# Patient Record
Sex: FEMALE | Race: WHITE | NOT HISPANIC OR LATINO
[De-identification: names, ages, dates, MRNs, and addresses within clinical notes are randomized per-mention and may not be internally consistent; named-entity substitution may affect disease eponyms.]

---

## 2019-03-07 PROBLEM — Z00.00 ENCOUNTER FOR PREVENTIVE HEALTH EXAMINATION: Status: ACTIVE | Noted: 2019-03-07

## 2019-03-13 ENCOUNTER — APPOINTMENT (OUTPATIENT)
Dept: GASTROENTEROLOGY | Facility: CLINIC | Age: 59
End: 2019-03-13

## 2019-05-29 ENCOUNTER — APPOINTMENT (OUTPATIENT)
Dept: GASTROENTEROLOGY | Facility: CLINIC | Age: 59
End: 2019-05-29
Payer: COMMERCIAL

## 2019-05-29 VITALS
DIASTOLIC BLOOD PRESSURE: 60 MMHG | OXYGEN SATURATION: 98 % | WEIGHT: 110 LBS | HEIGHT: 62 IN | SYSTOLIC BLOOD PRESSURE: 110 MMHG | BODY MASS INDEX: 20.24 KG/M2 | HEART RATE: 75 BPM

## 2019-05-29 DIAGNOSIS — Z78.9 OTHER SPECIFIED HEALTH STATUS: ICD-10-CM

## 2019-05-29 DIAGNOSIS — R63.4 ABNORMAL WEIGHT LOSS: ICD-10-CM

## 2019-05-29 PROCEDURE — 99245 OFF/OP CONSLTJ NEW/EST HI 55: CPT

## 2019-05-29 RX ORDER — PAROXETINE HYDROCHLORIDE 10 MG/1
10 TABLET, FILM COATED ORAL
Refills: 0 | Status: ACTIVE | COMMUNITY

## 2019-05-29 RX ORDER — HYDROCHLOROTHIAZIDE 12.5 MG/1
12.5 CAPSULE ORAL
Refills: 0 | Status: ACTIVE | COMMUNITY

## 2019-05-29 RX ORDER — PANTOPRAZOLE 40 MG/1
40 TABLET, DELAYED RELEASE ORAL
Refills: 0 | Status: ACTIVE | COMMUNITY

## 2019-05-29 NOTE — HISTORY OF PRESENT ILLNESS
[FreeTextEntry1] : 58 year F anxiety, htn,HLD, migraine HA, GERD on PPI x 2 years reflux controlled, h/o duodenal stricture,\par h/o fall in 02/2019 c/b LLE fracture required surgery, and RLE fx, presents for evaluation of h/o duodenal stricture. \par \par Due to recent fractures she takes Advil most days, she is in PT\par she lost 30 lbs since Feb due to fear of eating/stress related to fractures, she is continuing to lose weight. lost 8 lbs in the past month, \par she has feeling of fullness/heaviness after eating, improves with Gelusil /mylanta, she is afraid that she will develop similar problems to the past when she had severe duodenal stricture a/w chroinc vomiting. \par she takes Excedrin PRN for migraines 3-4 days of week. \par \par No prior colonoscopy\par \par Patient denies abdominal pain , n/v/heartburn, reflux, dysphagia/odynophagia, change in bowel habits, diarrhea, constipation, brbpr, melena.  Patient has daily BM, \par \par \par EGD 2/28/17 with Dr Enciso- \par for vomiting x 6 mos, no response to PPI, prior excessive Excedrin use, \par reflux ulcers at 30 cm, EGJ at 30 cm, 9-10 cm hiatal hernia, post bulbar duodenal stricture, benign appearing, \par rec:liquid diet, resume PPI, CT scan (performed at [facility]), \par path 2/28/17-\par antrum- acute chronic gastritis, neg HP\par esophagus at 30 cm bx- GEJ mucosa with squamous basal cell hyperplasia, chronic inflammation c/w reflux changes, no IM or dysplasia. \par \par \par CT 02/2017-results not available, reportedly unremarkable\par \par EGD 5/2/17 with Dr. Bardales- for persistent vomiting- \par edematous mucosa post bulbar duodenum with tight stricture, dilated, 36 F (12mm), small amount of bleeding after dilation, \par rec: continue bid ppi, pureed diet, UGI series to evaluate stricture (not seen on CT scan), probable repeat dilatation under fluoroscopy with intubation consider stenting stricture \par \par UGI series 5/16/17-\par persistent partially changeable bandlike narrowing in upper part of the second portio of the duodenal C sweep . no hold up to flow of barium.  no hiatal hernia\par \par she was been on liquid diet x 4 mos in 2017, gradually resumed regular diet which she is still on. \par she has been on PPI daily since 2017. 1/2 hour prior to dinner. \par \par Never had a colonoscopy

## 2019-05-29 NOTE — REASON FOR VISIT
[Consultation] : a consultation visit [FreeTextEntry1] : Patient seen at the request of Dr. Harper for the evaluation of duodenal stricture

## 2019-05-29 NOTE — ASSESSMENT
[FreeTextEntry1] : postprandial epigastric fullness  may be due to recurrent stricture which was previously thought to be NSAID induced. Recommend UGI series for evaluation followed by EGD +/- dilation of stricture. Recommend small frequent meals, limit NSAIDs, continue PPI.\par \par Recommend screening colonoscopy . Risks (including but not limited to sedation risks, infection, bleeding, perforation, possibility of missed lesions), benefits and alternatives to procedure, including not doing the procedure, were discussed with patient. Patient understood and agreed to proceed with colonoscopy. \par Colonoscopy preparation instructions reviewed with patient.\par

## 2019-05-29 NOTE — PHYSICAL EXAM
[General Appearance - In No Acute Distress] : in no acute distress [General Appearance - Alert] : alert [Outer Ear] : the ears and nose were normal in appearance [Sclera] : the sclera and conjunctiva were normal [] : no respiratory distress [Apical Impulse] : the apical impulse was normal [Neck Appearance] : the appearance of the neck was normal [Abdomen Tenderness] : non-tender [Abdomen Soft] : soft [Abnormal Walk] : normal gait [Skin Color & Pigmentation] : normal skin color and pigmentation [No Focal Deficits] : no focal deficits [Oriented To Time, Place, And Person] : oriented to person, place, and time [FreeTextEntry1] : deferred to upcoming colonoscopy

## 2019-06-05 ENCOUNTER — RESULT REVIEW (OUTPATIENT)
Age: 59
End: 2019-06-05

## 2019-07-02 ENCOUNTER — OTHER (OUTPATIENT)
Age: 59
End: 2019-07-02

## 2019-07-02 ENCOUNTER — APPOINTMENT (OUTPATIENT)
Dept: RHEUMATOLOGY | Facility: CLINIC | Age: 59
End: 2019-07-02
Payer: COMMERCIAL

## 2019-07-02 VITALS
BODY MASS INDEX: 20.24 KG/M2 | HEIGHT: 62 IN | SYSTOLIC BLOOD PRESSURE: 112 MMHG | WEIGHT: 110 LBS | DIASTOLIC BLOOD PRESSURE: 70 MMHG

## 2019-07-02 PROCEDURE — 99243 OFF/OP CNSLTJ NEW/EST LOW 30: CPT

## 2019-07-02 RX ORDER — BUTALBITAL, ACETAMINOPHEN AND CAFFEINE 325; 50; 40 MG/1; MG/1; MG/1
50-325-40 TABLET ORAL
Qty: 90 | Refills: 0 | Status: ACTIVE | COMMUNITY
Start: 2019-04-30

## 2019-07-02 RX ORDER — ROSUVASTATIN CALCIUM 5 MG/1
5 TABLET, FILM COATED ORAL
Qty: 30 | Refills: 0 | Status: ACTIVE | COMMUNITY
Start: 2019-05-29

## 2019-07-02 RX ORDER — POLYETHYLENE GLYCOL 3350, SODIUM SULFATE, SODIUM CHLORIDE, POTASSIUM CHLORIDE, ASCORBIC ACID, SODIUM ASCORBATE 7.5-2.691G
100 KIT ORAL
Qty: 1 | Refills: 0 | Status: DISCONTINUED | COMMUNITY
Start: 2019-05-29 | End: 2019-07-02

## 2019-07-02 NOTE — REVIEW OF SYSTEMS
[Joint Pain] : joint pain [Eye Pain] : no eye pain [Fever] : no fever [Chills] : no chills [Red Eyes] : eyes not red [Shortness Of Breath] : no shortness of breath [Cough] : no cough [Abdominal Pain] : no abdominal pain [Diarrhea] : no diarrhea [Joint Swelling] : no joint swelling [Joint Stiffness] : no joint stiffness [Skin Lesions] : no skin lesions [FreeTextEntry6] : No pleuritic C/P

## 2019-07-02 NOTE — HISTORY OF PRESENT ILLNESS
[FreeTextEntry1] : Patient fell off of steps onto concrete in January and sustained multiple fractures in the ankle - left >> right - and ortho tending to fractures told patent he thought she should have a BMD test. Last month was done, and revealed severe OP and patient was referred here for OP. No previous fracture.

## 2019-07-02 NOTE — PHYSICAL EXAM
[General Appearance - Alert] : alert [General Appearance - In No Acute Distress] : in no acute distress [General Appearance - Well Nourished] : well nourished [General Appearance - Well Developed] : well developed [Sclera] : the sclera and conjunctiva were normal [Auscultation Breath Sounds / Voice Sounds] : lungs were clear to auscultation bilaterally [Cervical Lymph Nodes Enlarged Posterior Bilaterally] : posterior cervical [Cervical Lymph Nodes Enlarged Anterior Bilaterally] : anterior cervical [] : no rash [Motor Exam] : the motor exam was normal [FreeTextEntry1] : There is no active synovitis throughout. There is crepitus of the patellofemoral joints bilaterally, with mild genu valgus.

## 2019-08-05 ENCOUNTER — RESULT REVIEW (OUTPATIENT)
Age: 59
End: 2019-08-05

## 2019-08-06 ENCOUNTER — APPOINTMENT (OUTPATIENT)
Dept: GASTROENTEROLOGY | Facility: HOSPITAL | Age: 59
End: 2019-08-06

## 2019-08-15 ENCOUNTER — APPOINTMENT (OUTPATIENT)
Dept: RHEUMATOLOGY | Facility: CLINIC | Age: 59
End: 2019-08-15
Payer: COMMERCIAL

## 2019-08-15 PROCEDURE — 99212 OFFICE O/P EST SF 10 MIN: CPT | Mod: 25

## 2019-08-15 PROCEDURE — 96372 THER/PROPH/DIAG INJ SC/IM: CPT

## 2019-08-15 NOTE — PHYSICAL EXAM
[General Appearance - Alert] : alert [General Appearance - In No Acute Distress] : in no acute distress [General Appearance - Well Developed] : well developed [General Appearance - Well Nourished] : well nourished [FreeTextEntry1] : There is no active synovitis throughout. There is crepitus of the patellofemoral joints bilaterally, with mild genu valgus.

## 2019-08-15 NOTE — REVIEW OF SYSTEMS
[Joint Pain] : joint pain [Fever] : no fever [Chills] : no chills [Eye Pain] : no eye pain [Red Eyes] : eyes not red [Shortness Of Breath] : no shortness of breath [Cough] : no cough [Abdominal Pain] : no abdominal pain [Diarrhea] : no diarrhea [Joint Swelling] : no joint swelling [Joint Stiffness] : no joint stiffness [Skin Lesions] : no skin lesions [FreeTextEntry6] : No pleuritic C/P

## 2019-08-18 ENCOUNTER — TRANSCRIPTION ENCOUNTER (OUTPATIENT)
Age: 59
End: 2019-08-18

## 2019-09-03 ENCOUNTER — APPOINTMENT (OUTPATIENT)
Dept: GASTROENTEROLOGY | Facility: HOSPITAL | Age: 59
End: 2019-09-03

## 2019-09-18 ENCOUNTER — APPOINTMENT (OUTPATIENT)
Dept: GASTROENTEROLOGY | Facility: HOSPITAL | Age: 59
End: 2019-09-18

## 2020-01-06 ENCOUNTER — RX RENEWAL (OUTPATIENT)
Age: 60
End: 2020-01-06

## 2020-02-05 ENCOUNTER — APPOINTMENT (OUTPATIENT)
Dept: INTERNAL MEDICINE | Facility: CLINIC | Age: 60
End: 2020-02-05
Payer: COMMERCIAL

## 2020-02-05 ENCOUNTER — MED ADMIN CHARGE (OUTPATIENT)
Age: 60
End: 2020-02-05

## 2020-02-05 ENCOUNTER — APPOINTMENT (OUTPATIENT)
Dept: INTERNAL MEDICINE | Facility: CLINIC | Age: 60
End: 2020-02-05

## 2020-02-05 DIAGNOSIS — M81.0 AGE-RELATED OSTEOPOROSIS W/OUT CURRENT PATHOLOGICAL FRACTURE: ICD-10-CM

## 2020-02-05 DIAGNOSIS — M17.0 BILATERAL PRIMARY OSTEOARTHRITIS OF KNEE: ICD-10-CM

## 2020-02-05 PROCEDURE — 96372 THER/PROPH/DIAG INJ SC/IM: CPT | Mod: 59

## 2020-02-05 PROCEDURE — 96401 CHEMO ANTI-NEOPL SQ/IM: CPT

## 2020-02-05 RX ORDER — DENOSUMAB 60 MG/ML
60 INJECTION SUBCUTANEOUS
Qty: 1 | Refills: 0 | Status: COMPLETED | OUTPATIENT
Start: 2020-02-05

## 2020-02-05 RX ADMIN — DENOSUMAB 60 MG/ML: 60 INJECTION SUBCUTANEOUS at 00:00

## 2020-02-10 PROBLEM — M81.0 AGE-RELATED OSTEOPOROSIS WITHOUT CURRENT PATHOLOGICAL FRACTURE: Status: ACTIVE | Noted: 2019-07-02

## 2020-07-16 RX ORDER — DENOSUMAB 60 MG/ML
60 INJECTION SUBCUTANEOUS
Qty: 1 | Refills: 0 | Status: ACTIVE | COMMUNITY
Start: 2019-07-02 | End: 1900-01-01

## 2020-08-05 ENCOUNTER — APPOINTMENT (OUTPATIENT)
Dept: INTERNAL MEDICINE | Facility: CLINIC | Age: 60
End: 2020-08-05

## 2022-07-20 ENCOUNTER — APPOINTMENT (OUTPATIENT)
Dept: GASTROENTEROLOGY | Facility: CLINIC | Age: 62
End: 2022-07-20

## 2022-07-20 VITALS
OXYGEN SATURATION: 98 % | BODY MASS INDEX: 24.66 KG/M2 | HEART RATE: 75 BPM | DIASTOLIC BLOOD PRESSURE: 70 MMHG | HEIGHT: 62 IN | WEIGHT: 134 LBS | SYSTOLIC BLOOD PRESSURE: 110 MMHG

## 2022-07-20 DIAGNOSIS — K22.70 BARRETT'S ESOPHAGUS W/OUT DYSPLASIA: ICD-10-CM

## 2022-07-20 DIAGNOSIS — K21.9 GASTRO-ESOPHAGEAL REFLUX DISEASE W/OUT ESOPHAGITIS: ICD-10-CM

## 2022-07-20 DIAGNOSIS — Z12.11 ENCOUNTER FOR SCREENING FOR MALIGNANT NEOPLASM OF COLON: ICD-10-CM

## 2022-07-20 DIAGNOSIS — K31.5 OBSTRUCTION OF DUODENUM: ICD-10-CM

## 2022-07-20 PROCEDURE — 99244 OFF/OP CNSLTJ NEW/EST MOD 40: CPT

## 2022-07-20 RX ORDER — ALPRAZOLAM 0.25 MG/1
0.25 TABLET ORAL
Qty: 30 | Refills: 0 | Status: DISCONTINUED | COMMUNITY
Start: 2019-04-30 | End: 2022-07-20

## 2022-07-20 NOTE — ASSESSMENT
[FreeTextEntry1] : h/o duodenal stricture- \par improved after dilation in 2019, patient gaining weight since this time.  \par will evaluate at time of next EGD\par \par GERD- still with some breakthrough. 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 \par

## 2022-07-20 NOTE — HISTORY OF PRESENT ILLNESS
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 \par today, she feels well. still gets full easily. takes excedrin now for HA 1-2 x per week. she has gained 24 lbs in the last 3 years. \par she tried to limit carbs. \par takes ppi daily still has breakthrough reflux 1 x per week. \par takes align daily. she started it last year when receiving abx for Lyme. \par \par \par Patient denies abdominal pain , n/v/heartburn, reflux, dysphagia/odynophagia, change in bowel habits, diarrhea, constipation, brbpr, melena.  Patient has daily BM, \par \par \par 6/2019:esophagram\par  IMPRESSION: No significant change in an area of short stricture in postbulbar portion of descending\par duodenum since prior study 5/16/2017.\par \par \par 08/2019- EGD/Colonoscopy for h/o duodenal stricture/CRC screening. \par stricture at duodenal sweep, normal stomach, moderate HH,2 cm tongue of salmon mucosa, 6 mm cecal polyp, 3 mm and 5 mm rectal polyps, small internal hemorrhoids\par \par Path\par stomach bx unremarkable. GEJ at 36 cm- c/w nondysplastic BE, Esophagus at 34 c/w Nondysplastic BE, cecum polyp -hyperplastic, rectal polyps, hyperplastic. \par \par Rec, referred to Dr. Mariee for dilation of duodenal stricture, limit NSAIDS, continue daily PPI, repeat EGD in 2 years, sugery for HH can be considered-patient declined. \par \par recall for colonoscopy in 5 years, then changed to 3-5 y due to fam hx colon polyps   \par EGD with dilation 9/3/19- with Dr. Mariee, small HH, irregu zline, 8-9-10 TTS CRE dilating balloon advanced and inflated to max of 10 mm, the scope was unable to be passed distally. \par rec: cont PPI and repeat EGD in 2-3 weeks. \par \par patient has been feeling well since this time and did not return for repeat dilation.  \par \par \par EGD 2/28/17 with Dr Enciso- \par for vomiting x 6 mos, no response to PPI, prior excessive Excedrin use, \par reflux ulcers at 30 cm, EGJ at 30 cm, 9-10 cm hiatal hernia, post bulbar duodenal stricture, benign appearing, \par rec:liquid diet, resume PPI, CT scan (performed at [facility]), \par path 2/28/17-\par antrum- acute chronic gastritis, neg HP\par esophagus at 30 cm bx- GEJ mucosa with squamous basal cell hyperplasia, chronic inflammation c/w reflux changes, no IM or dysplasia. \par \par \par CT 02/2017-results not available, reportedly unremarkable\par \par EGD 5/2/17 with Dr. Bardales- for persistent vomiting- \par edematous mucosa post bulbar duodenum with tight stricture, dilated, 36 F (12mm), small amount of bleeding after dilation, \par rec: continue bid ppi, pureed diet, UGI series to evaluate stricture (not seen on CT scan), probable repeat dilatation under fluoroscopy with intubation consider stenting stricture \par \par UGI series 5/16/17-\par persistent partially changeable bandlike narrowing in upper part of the second portio of the duodenal C sweep . no hold up to flow of barium.  no hiatal hernia\par \par she was been on liquid diet x 4 mos in 2017, gradually resumed regular diet which she is still on. \par she has been on PPI daily since 2017. 1/2 hour prior to dinner. \par \par Never had a colonoscopy\par \par fam hx: sister \par niece- colon polyps in her 20s. \par sister colon polyps\par

## 2022-07-20 NOTE — PHYSICAL EXAM
[General Appearance - Alert] : alert [General Appearance - In No Acute Distress] : in no acute distress [Sclera] : the sclera and conjunctiva were normal [Outer Ear] : the ears and nose were normal in appearance [Neck Appearance] : the appearance of the neck was normal [] : no respiratory distress [Apical Impulse] : the apical impulse was normal [Abdomen Soft] : soft [Abdomen Tenderness] : non-tender [Abnormal Walk] : normal gait [Skin Color & Pigmentation] : normal skin color and pigmentation [No Focal Deficits] : no focal deficits [Oriented To Time, Place, And Person] : oriented to person, place, and time [FreeTextEntry1] : deferred to upcoming colonoscopy

## 2022-10-03 ENCOUNTER — APPOINTMENT (OUTPATIENT)
Dept: GASTROENTEROLOGY | Facility: HOSPITAL | Age: 62
End: 2022-10-03

## 2022-10-04 ENCOUNTER — NON-APPOINTMENT (OUTPATIENT)
Age: 62
End: 2022-10-04